# Patient Record
(demographics unavailable — no encounter records)

---

## 2017-11-10 DIAGNOSIS — Z12.11 COLON CANCER SCREENING: ICD-10-CM

## 2018-01-20 DIAGNOSIS — I10 ESSENTIAL HYPERTENSION: ICD-10-CM

## 2018-01-20 NOTE — LETTER
January 25, 2018    Richard Sparks  [address]  [facility] [address]             [facility]  [address]  [facility] [address]  Phone: [phone]  Fax: [phone] Dear Richard Sparks,    We received a refill request for your medication. Your request has been forwarded to Dr. Ida Calvillo MD. The refill was approved, however any future refills will require an office visit.   Please call the office at [phone] or [facility] to your [facility] to schedule.     Thanks,  Natalia

## 2018-01-22 RX ORDER — HYDROCHLOROTHIAZIDE 25 MG/1
TABLET ORAL
Qty: 90 TABLET | Refills: 0 | Status: SHIPPED | OUTPATIENT
Start: 2018-01-22

## 2018-06-25 DIAGNOSIS — Z12.39 BREAST CANCER SCREENING: ICD-10-CM
